# Patient Record
Sex: FEMALE
[De-identification: names, ages, dates, MRNs, and addresses within clinical notes are randomized per-mention and may not be internally consistent; named-entity substitution may affect disease eponyms.]

---

## 2018-08-27 ENCOUNTER — HOSPITAL ENCOUNTER (OUTPATIENT)
Dept: HOSPITAL 14 - H.ER | Age: 33
Setting detail: OBSERVATION
LOS: 1 days | Discharge: HOME | End: 2018-08-28
Attending: OBSTETRICS & GYNECOLOGY | Admitting: OBSTETRICS & GYNECOLOGY
Payer: COMMERCIAL

## 2018-08-27 DIAGNOSIS — E03.9: ICD-10-CM

## 2018-08-27 DIAGNOSIS — E86.1: ICD-10-CM

## 2018-08-27 DIAGNOSIS — D62: ICD-10-CM
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
## 2018-08-27 DIAGNOSIS — N99.820: Primary | ICD-10-CM

## 2018-08-27 LAB
ALBUMIN SERPL-MCNC: 4.8 G/DL (ref 3.5–5)
ALBUMIN/GLOB SERPL: 1.4 {RATIO} (ref 1–2.1)
ALT SERPL-CCNC: 27 U/L (ref 9–52)
APTT BLD: 43.5 SECONDS (ref 25.6–37.1)
AST SERPL-CCNC: 24 U/L (ref 14–36)
BASE EXCESS BLDV CALC-SCNC: 2.8 MMOL/L (ref 0–2)
BASOPHILS # BLD AUTO: 0 K/UL (ref 0–0.2)
BASOPHILS NFR BLD: 0.5 % (ref 0–2)
BUN SERPL-MCNC: 15 MG/DL (ref 7–17)
CALCIUM SERPL-MCNC: 9.6 MG/DL (ref 8.4–10.2)
EOSINOPHIL # BLD AUTO: 0 K/UL (ref 0–0.7)
EOSINOPHIL NFR BLD: 0.1 % (ref 0–4)
ERYTHROCYTE [DISTWIDTH] IN BLOOD BY AUTOMATED COUNT: 15.6 % (ref 11.5–14.5)
GFR NON-AFRICAN AMERICAN: > 60
HGB BLD-MCNC: 13.2 G/DL (ref 12–16)
INR PPP: 1
LYMPHOCYTES # BLD AUTO: 2.4 K/UL (ref 1–4.3)
LYMPHOCYTES NFR BLD AUTO: 27 % (ref 20–40)
MCH RBC QN AUTO: 29.8 PG (ref 27–31)
MCHC RBC AUTO-ENTMCNC: 33.4 G/DL (ref 33–37)
MCV RBC AUTO: 89.2 FL (ref 81–99)
MONOCYTES # BLD: 0.7 K/UL (ref 0–0.8)
MONOCYTES NFR BLD: 7.5 % (ref 0–10)
NEUTROPHILS # BLD: 5.8 K/UL (ref 1.8–7)
NEUTROPHILS NFR BLD AUTO: 64.9 % (ref 50–75)
NRBC BLD AUTO-RTO: 0.1 % (ref 0–0)
PCO2 BLDV: 63 MMHG (ref 40–60)
PH BLDV: 7.3 [PH] (ref 7.32–7.43)
PLATELET # BLD: 223 K/UL (ref 130–400)
PMV BLD AUTO: 10.3 FL (ref 7.2–11.7)
PROTHROMBIN TIME: 11 SECONDS (ref 9.8–13.1)
RBC # BLD AUTO: 4.42 MIL/UL (ref 3.8–5.2)
VENOUS BLOOD FIO2: 21 %
VENOUS BLOOD GAS PO2: 17 MM/HG (ref 30–55)
WBC # BLD AUTO: 9 K/UL (ref 4.8–10.8)

## 2018-08-27 PROCEDURE — 86850 RBC ANTIBODY SCREEN: CPT

## 2018-08-27 PROCEDURE — 84702 CHORIONIC GONADOTROPIN TEST: CPT

## 2018-08-27 PROCEDURE — 82803 BLOOD GASES ANY COMBINATION: CPT

## 2018-08-27 PROCEDURE — 99285 EMERGENCY DEPT VISIT HI MDM: CPT

## 2018-08-27 PROCEDURE — 85025 COMPLETE CBC W/AUTO DIFF WBC: CPT

## 2018-08-27 PROCEDURE — 85027 COMPLETE CBC AUTOMATED: CPT

## 2018-08-27 PROCEDURE — 85610 PROTHROMBIN TIME: CPT

## 2018-08-27 PROCEDURE — 96360 HYDRATION IV INFUSION INIT: CPT

## 2018-08-27 PROCEDURE — 85730 THROMBOPLASTIN TIME PARTIAL: CPT

## 2018-08-27 PROCEDURE — 88307 TISSUE EXAM BY PATHOLOGIST: CPT

## 2018-08-27 PROCEDURE — 80053 COMPREHEN METABOLIC PANEL: CPT

## 2018-08-27 PROCEDURE — 93005 ELECTROCARDIOGRAM TRACING: CPT

## 2018-08-27 PROCEDURE — 36415 COLL VENOUS BLD VENIPUNCTURE: CPT

## 2018-08-27 PROCEDURE — 86900 BLOOD TYPING SEROLOGIC ABO: CPT

## 2018-08-27 PROCEDURE — 57522 CONIZATION OF CERVIX: CPT

## 2018-08-27 PROCEDURE — 86920 COMPATIBILITY TEST SPIN: CPT

## 2018-08-27 PROCEDURE — 57720 REVISION OF CERVIX: CPT

## 2018-08-27 PROCEDURE — 36430 TRANSFUSION BLD/BLD COMPNT: CPT

## 2018-08-27 RX ADMIN — VENLAFAXINE HYDROCHLORIDE SCH MG: 75 CAPSULE, EXTENDED RELEASE ORAL at 23:54

## 2018-08-27 RX ADMIN — VENLAFAXINE HYDROCHLORIDE SCH MG: 150 CAPSULE, EXTENDED RELEASE ORAL at 23:54

## 2018-08-27 NOTE — ED PDOC
HPI: Female  Pain


Time Seen by Provider: 08/27/18 16:19


Chief Complaint (Nursing): Female Genitourinary


History Per: Patient


History/Exam Limitations: clinical condition


Onset/Duration Of Symptoms: Days


Current Symptoms Are (Timing): Still Present


Additional Complaint(s): 





32 yo F presented to the ED with complaint of vaginal bleeding following a 

colposcopy procedure.  RN noted patient to be tachycardic and immediately 

placed IVs and began fluids and came to get this writter for immediate 

evaluation. On entering the room, the patient was altered and then became 

unconscious.  Heart rate was seen to drop from the 70s at the time of entering 

the room to the 50s.  Bright red blood was seen covering both legs and a large 

grapefruit sized clot seen between her legs with continuous bleeding from 

vagina seen.  Pt was pale, unresponsive to sternal rub but BP was normal and 

breath sounds were clear bilaterally with 99% O2 sats on NC. A second liter of 

fluids was hung with pressure bags. Gynecologist was immediately called to the 

ED, an RN went to the blood bank to get 2 units of O neg blood, and patient 

moved into the trauma room.  Once in trauma room blood transfusion was started.

  In trauma room, pt became responsive and able to provide the following history

:





On August 21st, pt had a cone biopsy performed by her gynecologist in [facility] 

(near pts home in [place]).  Pt continued to have spotting for the next two 

days and return to the gynecologist and a "sponge" was place to stop the 

bleeding.  TOday the patient had to take her son to [place] where he sees 

a specialist for a prosthetic eye.  She was then driving to [place] to visit 

her sister when she noticed she was bleeding heavily.  When she was closer to 

her sister's house, the blood was "pouring from the vagina".  She called her 

sister to meet her at the hospital. 





Pt states she has been following a gynecologist routinely due to abnormalities 

following the birth of her last child 2 years ago and for HPV workup.  She 

denies history of bleeding disorders.  Pt has hypothyroidism and takes 

medications for that.  





Gynecologist quickly responded to the ED and his initially evaluation, he 

determined the patient needed to go to the OR.  Labs had been sent.


Abnormal Vaginal Bleeding: Yes





Past Medical History


Vital Signs: 





 Last Vital Signs











Temp  98.1 F   08/27/18 17:47


 


Pulse  76   08/27/18 17:47


 


Resp  19   08/27/18 17:47


 


BP  129/70   08/27/18 17:47


 


Pulse Ox  100   08/27/18 17:47














- Medical History


PMH: Hypothyroidism





- Family History


Family History: States: Unknown Family Hx





- Home Medications


Home Medications: 


 Ambulatory Orders











 Medication  Instructions  Recorded


 


Ethinyl Estradiol/Drospirenone 1 each PO DAILY 08/27/18





[Khadra 28 Tablet]  


 


Levothyroxine [Synthroid] 75 mcg PO DAILY 08/27/18


 


Venlafaxine [Effexor XR] 150 mg PO DAILY 08/27/18


 


Venlafaxine [Effexor-XR] 75 mg PO DAILY 08/27/18














- Allergies


Allergies/Adverse Reactions: 


 Allergies











Allergy/AdvReac Type Severity Reaction Status Date / Time


 


No Known Allergies Allergy   Verified 08/27/18 16:05














Review of Systems


Constitutional: Positive for: Weakness


Cardiovascular: Negative for: Chest Pain, Palpitations


Respiratory: Negative for: Cough, Shortness of Breath


Gastrointestinal: Negative for: Nausea, Vomiting, Abdominal Pain


Genitourinary Female: Positive for: Vaginal Bleeding


Neurological: Positive for: Weakness





Physical Exam





- Physical Exam


Appears: Positive for: In Acute Distress


Head Exam: Positive for: ATRAUMATIC


Skin: Positive for: Pallor, Cyanosis


Eye Exam: Positive for: Normal appearance, EOMI, PERRL


Cardiovascular/Chest: Positive for: Bradycardia, Tachycardia (initially 

tachycardic and became bradycardic during resuscitaiton)


Respiratory: Positive for: Normal Breath Sounds


Pulses-Dorsalis Pedis (L): 1+


Pulses-Dorsalis Pedis (R): 1+


Pulses-Radial (L): 1+


Pulses-Radial (R): 1+


Pelvic Exam: Positive for: Active Bleeding (with large clots filling multiple 

basins)





- Laboratory Results


Result Diagrams: 


 08/28/18 05:45





 08/27/18 16:35





- ECG


O2 Sat by Pulse Oximetry: 100





- Critical Care


Total Time (In Min): 100 (resuscitation and stabilation for OR)





Medical Decision Making


Medical Decision Making: 





see HPI.  Pt initially walked in with vaginal bleeding following colposcopy.  

Sutures most likely didn't hold causing severe vaginal bleeding leading to 

hypovolemia.  Pt responded to initial IV fluids and then was started on 2L of 

blood transfusion. Gyneclogist promptly responded to the ED and arrived as pt 

regained consciousness.  Gynecologist did pelvic exam and determined pt needed 

to go to the OR.  Labs sent from ED and pt was able to provided consent. 

Additional PRBCs and IV fluids ordered.





Disposition





- Clinical Impression


Clinical Impression: 


 Acute bleeding, Vaginal bleeding








- Patient ED Disposition


Is Patient to be Admitted: Yes





- Disposition


Disposition Time: 15:40


Condition: CRITICAL

## 2018-08-27 NOTE — CP.PCM.HP
History of Present Illness





- History of Present Illness


History of Present Illness: 





32yo female 1 week s/p LEEP cone bx.  Pt reports multiple episodes of vaginal 

bleeding since procedure.  Pt reports heavy bleeding today.  Pt presented to ED 

with unstable VS and loss of consciousness.  Pt given IVF and VS normalized.  





Present on Admission





- Present on Admission


Any Indicators Present on Admission: No


History of DVT/PE: No


History of Uncontrolled Diabetes: No


Urinary Catheter: No


Decubitus Ulcer Present: No





Review of Systems





- Review of Systems


Systems not reviewed;Unavailable: Unstable Vital Signs





Past Patient History





- Past Social History


Smoking Status: Never Smoked





- ENDOCRINE/METABOLIC


Hx Endocrine Disorders: Yes


Hx Hypothyroidism: Yes





- PSYCHIATRIC


Hx Substance Use: No





- SURGICAL HISTORY


Hx Surgeries: Yes


Other/Comment: Breast reduction, cone biopsy, pilonidal cyst removal.





Meds


Allergies/Adverse Reactions: 


 Allergies











Allergy/AdvReac Type Severity Reaction Status Date / Time


 


No Known Allergies Allergy   Verified 08/27/18 16:05














Physical Exam





- Constitutional


Appears: In Acute Distress





- Respiratory Exam


Respiratory Exam: Clear to Auscultation Bilateral, NORMAL BREATHING PATTERN





- Cardiovascular Exam


Cardiovascular Exam: REGULAR RHYTHM





- GI/Abdominal Exam


GI & Abdominal Exam: Soft.  absent: Distended, Tenderness





-  Exam


Additional comments: 





Large amount of blood and clots in vaginal.  Area actively bleeding.  Unable to 

fully visualize cervix due to heavy amount of bleeding.











Results





- Vital Signs


Recent Vital Signs: 





 Last Vital Signs











Temp  98.3 F   08/27/18 17:05


 


Pulse  81   08/27/18 17:05


 


Resp  13   08/27/18 17:05


 


BP  136/88   08/27/18 17:05


 


Pulse Ox  99   08/27/18 16:05














- Labs


Result Diagrams: 


 08/27/18 16:35





 08/27/18 16:35


Labs: 





 Laboratory Results - last 24 hr











  08/27/18 08/27/18 08/27/18





  16:15 16:31 16:35


 


WBC   


 


RBC   


 


Hgb   


 


Hct   


 


MCV   


 


MCH   


 


MCHC   


 


RDW   


 


Plt Count   


 


MPV   


 


Neut % (Auto)   


 


Lymph % (Auto)   


 


Mono % (Auto)   


 


Eos % (Auto)   


 


Baso % (Auto)   


 


Neut # (Auto)   


 


Lymph # (Auto)   


 


Mono # (Auto)   


 


Eos # (Auto)   


 


Baso # (Auto)   


 


PT   


 


INR   


 


APTT   


 


pO2   17 L 


 


VBG pH   7.30 L 


 


VBG pCO2   63 H 


 


VBG HCO3   25.0 


 


VBG Total CO2   32.9 H 


 


VBG O2 Sat (Calc)   22.5 L 


 


VBG Base Excess   2.8 H 


 


VBG Potassium   3.6 


 


Sodium   138.0  139


 


Chloride   100.0  99


 


Glucose   99 


 


Lactate   2.0 


 


FiO2   21.0 


 


Blood Gas Comments   Lac=2.0 


 


Crit Value Called To   jorge l Cam 


 


Crit Value Called By   22 


 


Crit Value Read Back   Y 


 


Blood Gas Notified Time   1640 


 


Potassium    3.5 L


 


Carbon Dioxide    26


 


Anion Gap    18


 


BUN    15


 


Creatinine    0.7


 


Est GFR ( Amer)    > 60


 


Est GFR (Non-Af Amer)    > 60


 


Random Glucose    105


 


Calcium    9.6


 


Total Bilirubin    0.2


 


AST    24


 


ALT    27


 


Alkaline Phosphatase    71


 


Total Protein    8.2


 


Albumin    4.8


 


Globulin    3.4


 


Albumin/Globulin Ratio    1.4


 


Beta HCG, Quant    < 2.39


 


Venous Blood Potassium   3.6 


 


Crossmatch  See Detail  














  08/27/18 08/27/18





  16:35 16:35


 


WBC  9.0 


 


RBC  4.42 


 


Hgb  13.2 


 


Hct  39.4 


 


MCV  89.2 


 


MCH  29.8 


 


MCHC  33.4 


 


RDW  15.6 H 


 


Plt Count  223 


 


MPV  10.3 


 


Neut % (Auto)  64.9 


 


Lymph % (Auto)  27.0 


 


Mono % (Auto)  7.5 


 


Eos % (Auto)  0.1 


 


Baso % (Auto)  0.5 


 


Neut # (Auto)  5.8 


 


Lymph # (Auto)  2.4 


 


Mono # (Auto)  0.7 


 


Eos # (Auto)  0.0 


 


Baso # (Auto)  0.0 


 


PT   11.0


 


INR   1.0


 


APTT   43.5 H


 


pO2  


 


VBG pH  


 


VBG pCO2  


 


VBG HCO3  


 


VBG Total CO2  


 


VBG O2 Sat (Calc)  


 


VBG Base Excess  


 


VBG Potassium  


 


Sodium  


 


Chloride  


 


Glucose  


 


Lactate  


 


FiO2  


 


Blood Gas Comments  


 


Crit Value Called To  


 


Crit Value Called By  


 


Crit Value Read Back  


 


Blood Gas Notified Time  


 


Potassium  


 


Carbon Dioxide  


 


Anion Gap  


 


BUN  


 


Creatinine  


 


Est GFR ( Amer)  


 


Est GFR (Non-Af Amer)  


 


Random Glucose  


 


Calcium  


 


Total Bilirubin  


 


AST  


 


ALT  


 


Alkaline Phosphatase  


 


Total Protein  


 


Albumin  


 


Globulin  


 


Albumin/Globulin Ratio  


 


Beta HCG, Quant  


 


Venous Blood Potassium  


 


Crossmatch  














Assessment & Plan





- Assessment and Plan (Free Text)


Assessment: 





Acute vaginal bleeding s/p LEEP cone biopsy;  Unstable VS with evidence of 

acute anemia.


Plan: 





Discussed with patient the need for exam under anesthesia and stopping the 

bleeding with either electrocautery or suturing.  Discussed the R/B/A of 

surgery with patient and all patient questions answered.  OR notified and 

patient transferred STAT to ED.





- Date & Time


Date: 08/27/18


Time: 17:17

## 2018-08-28 VITALS — OXYGEN SATURATION: 100 %

## 2018-08-28 VITALS
SYSTOLIC BLOOD PRESSURE: 109 MMHG | DIASTOLIC BLOOD PRESSURE: 68 MMHG | RESPIRATION RATE: 18 BRPM | TEMPERATURE: 98.2 F | HEART RATE: 80 BPM

## 2018-08-28 LAB
ERYTHROCYTE [DISTWIDTH] IN BLOOD BY AUTOMATED COUNT: 15 % (ref 11.5–14.5)
HGB BLD-MCNC: 10.5 G/DL (ref 12–16)
MCH RBC QN AUTO: 30.7 PG (ref 27–31)
MCHC RBC AUTO-ENTMCNC: 34.8 G/DL (ref 33–37)
MCV RBC AUTO: 88.5 FL (ref 81–99)
PLATELET # BLD: 127 K/UL (ref 130–400)
RBC # BLD AUTO: 3.4 MIL/UL (ref 3.8–5.2)
WBC # BLD AUTO: 8.8 K/UL (ref 4.8–10.8)

## 2018-08-28 RX ADMIN — OXYCODONE HYDROCHLORIDE AND ACETAMINOPHEN PRN TAB: 5; 325 TABLET ORAL at 05:17

## 2018-08-28 RX ADMIN — OXYCODONE HYDROCHLORIDE AND ACETAMINOPHEN PRN TAB: 5; 325 TABLET ORAL at 01:19

## 2018-08-28 RX ADMIN — VENLAFAXINE HYDROCHLORIDE SCH: 150 CAPSULE, EXTENDED RELEASE ORAL at 09:00

## 2018-08-28 RX ADMIN — VENLAFAXINE HYDROCHLORIDE SCH: 75 CAPSULE, EXTENDED RELEASE ORAL at 09:00

## 2018-08-28 RX ADMIN — VENLAFAXINE HYDROCHLORIDE SCH MG: 150 CAPSULE, EXTENDED RELEASE ORAL at 08:57

## 2018-08-28 RX ADMIN — VENLAFAXINE HYDROCHLORIDE SCH MG: 75 CAPSULE, EXTENDED RELEASE ORAL at 08:57

## 2018-08-28 NOTE — OP
Copied To:  Dewey Arceo MD

Attending MD:  Dewey Arceo MD



PROCEDURE DATE:  08/27/2018



PREOPERATIVE DIAGNOSES:  Acute vaginal bleedings, approximately one week

status post loop electrosurgery excision procedure cone biopsy.  Acute

anemia due to blood loss, requiring transfusion.



POSTOPERATIVE DIAGNOSES:  Acute vaginal bleedings, approximately one week

status post loop electrosurgery excision procedure cone biopsy.  Acute

anemia due to blood loss, requiring transfusion.



OPERATION PERFORMED:  Examination under anesthesia, revision of loop

electrosurgery excision procedure cone biopsy site, controlling of

hemorrhage with electrocautery and suturing.



OPERATIVE FINDINGS:  Cone biopsy site with active bleeding.  Otherwise,

normal appearing pelvic anatomy.



ESTIMATED BLOOD LOSS:  300 mL intraoperatively.



FLUIDS:  800 mL lactated Ringer's.



URINE OUTPUT:  200 mL of clear urine.



The patient received 2 units of packet red blood cells preoperatively and

intraoperatively.



SURGEON:  Dewey Arceo MD



ANESTHESIOLOGIST:  Dewey Shepard MD



ANESTHESIA:  General.



DESCRIPTION OF PROCEDURE:  The patient was taken to the operating room

where general anesthesia was found to be adequate.  The patient was prepped

and draped in normal sterile fashion in dorsal lithotomy position.  A

weighted speculum was placed at the posterior aspect of the vagina.  The

Elizondo retractor was placed at the anterior surface of the vagina.  After

removal of blood and clots from vagina, the cervix was visualized. 

Multiple areas of the cervix were found to be actively bleeding.  Due to

location of bleeding, a decision for revision of biopsy site.  With loop

electrocautery, an approximately 1 cm to 2 cm in length portion was removed

from the cervix.  After removal of this portion of the cervix, the biopsy

sites were treated with electrocautery until hemostasis noted.  Interrupted

sutures were placed at the lateral aspect of the cervix to promote

continued hemostasis.  At approximately 3 o'clock and 9 o'clock locations,

interrupted suture of 0 Vicryl was placed.  After placement of both

sutures, reinspection of the cervix was found to be hemostatic.



The patient tolerated the procedure well.  Sponge, lap, and needle counts

were correct x2.  There were no complications.  The patient was taken to

recovery room awake in stable condition.



__________________________________________

Dewey Arceo MD





DD:  08/28/2018 9:22:57

DT:  08/28/2018 10:00:06

[facility] # [id number]

## 2018-08-28 NOTE — PCM.SURG1
Surgeon's Initial Post Op Note





- Surgeon's Notes


Surgeon: Adis


Assistant: N/A


Type of Anesthesia: General Endo


Anesthesia Administered By: Yapp


Pre-Operative Diagnosis: Acute vaginal bleeding s/p LEEP bx


Operative Findings: Cervix with acute bleeding


Post-Operative Diagnosis: same


Operation Performed: Revision of LEEP procedure site.  Control of bleeding with 

electrocautery and suture


Specimen/Specimens Removed: Portions of cervix


Estimated Blood Loss: EBL {In ML}: 300


Blood Products Given: PRBC


Drains Used: No Drains


Post-Op Condition: Good


Date of Surgery/Procedure: 08/27/18


Time of Surgery/Procedure: 17:30

## 2018-08-28 NOTE — CP.PCM.PN
Subjective





- Date & Time of Evaluation


Date of Evaluation: 08/28/18


Time of Evaluation: 09:13





- Subjective


Subjective: 


Patient resting comfortably without complaints. Patient denies any pain. Any 

further bleeding. Patient denies any signs or symptoms of anemia.





Objective





- Vital Signs/Intake and Output


Vital Signs (last 24 hours): 


 











Temp Pulse Resp BP Pulse Ox


 


 98.2 F   80   18   109/68   99 


 


 08/28/18 07:49  08/28/18 07:49  08/28/18 07:49  08/28/18 07:49  08/28/18 07:49








Intake and Output: 


 











 08/28/18 08/28/18





 06:59 18:59


 


Output Total 850 


 


Balance -850 














- Medications


Medications: 


 Current Medications





Lactated Ringer's (Lactated Ringer's)  1,000 mls @ 100 mls/hr IV .Q10H [facility]


   Last Admin: 08/27/18 23:54 Dose:  100 mls/hr


Ibuprofen (Motrin Tab)  600 mg PO Q8 PRN


   PRN Reason: Pain, moderate (4-7)


   Last Admin: 08/28/18 00:26 Dose:  600 mg


Levothyroxine Sodium (Synthroid)  75 mcg PO DAILY@0630 [facility]


   Last Admin: 08/28/18 08:52 Dose:  Not Given


Oxycodone/Acetaminophen (Percocet 5/325 Mg Tab)  1 tab PO Q4 PRN


   PRN Reason: Pain, severe (8-10)


   Stop: 08/30/18 21:21


   Last Admin: 08/28/18 05:17 Dose:  1 tab


Venlafaxine HCl (Effexor Xr)  75 mg PO DAILY [facility]


   Last Admin: 08/28/18 09:00 Dose:  Not Given


Venlafaxine HCl (Effexor Xr)  150 mg PO DAILY [facility]


   Last Admin: 08/28/18 09:00 Dose:  Not Given











- Labs


Labs: 


 





 08/28/18 05:45 





 08/27/18 16:35 





 











PT  11.0 Seconds (9.8-13.1)   08/27/18  16:35    


 


INR  1.0   08/27/18  16:35    


 


APTT  43.5 Seconds (25.6-37.1)  H  08/27/18  16:35    














- Constitutional


Appears: Well, Non-toxic, No Acute Distress





- Head Exam


Head Exam: ATRAUMATIC





- Eye Exam


Eye Exam: Normal appearance, PERRL





- ENT Exam


ENT Exam: Mucous Membranes Moist





- Respiratory Exam


Respiratory Exam: Clear to Ausculation Bilateral, NORMAL BREATHING PATTERN





- Cardiovascular Exam


Cardiovascular Exam: REGULAR RHYTHM





- GI/Abdominal Exam


GI & Abdominal Exam: Soft.  absent: Distended, Tenderness





Assessment and Plan





- Assessment and Plan (Free Text)


Assessment: 





Status post revision of LEEP cone biopsy site and control of hemorrhage. 

Patient recovering well. Posttransfusion CBC stable.


Plan: 





Discharge home today. Patient will follow up with primary physician in 2-3 days.

## 2018-08-28 NOTE — CP.PCM.DIS
Provider





- Provider


Date of Admission: 


08/27/18 16:43





Attending physician: 


Dewey Acreo MD





Time Spent in preparation of Discharge (in minutes): 10





Diagnosis





- Discharge Diagnosis


(1) Acute bleeding


Status: Acute   





(2) Acute blood loss anemia


Status: Acute   





Hospital Course





- Lab Results


Lab Results: 


 Most Recent Lab Values











WBC  8.8 K/uL (4.8-10.8)   08/28/18  05:45    


 


RBC  3.40 Mil/uL (3.80-5.20)  L  08/28/18  05:45    


 


Hgb  10.5 g/dL (12.0-16.0)  L D 08/28/18  05:45    


 


Hct  30.1 % (34.0-47.0)  L  08/28/18  05:45    


 


MCV  88.5 fl (81.0-99.0)   08/28/18  05:45    


 


MCH  30.7 pg (27.0-31.0)   08/28/18  05:45    


 


MCHC  34.8 g/dL (33.0-37.0)   08/28/18  05:45    


 


RDW  15.0 % (11.5-14.5)  H  08/28/18  05:45    


 


Plt Count  127 K/uL (130-400)  L D 08/28/18  05:45    


 


MPV  10.3 fl (7.2-11.7)   08/27/18  16:35    


 


Neut % (Auto)  64.9 % (50.0-75.0)   08/27/18  16:35    


 


Lymph % (Auto)  27.0 % (20.0-40.0)   08/27/18  16:35    


 


Mono % (Auto)  7.5 % (0.0-10.0)   08/27/18  16:35    


 


Eos % (Auto)  0.1 % (0.0-4.0)   08/27/18  16:35    


 


Baso % (Auto)  0.5 % (0.0-2.0)   08/27/18  16:35    


 


Neut # (Auto)  5.8 K/uL (1.8-7.0)   08/27/18  16:35    


 


Lymph # (Auto)  2.4 K/uL (1.0-4.3)   08/27/18  16:35    


 


Mono # (Auto)  0.7 K/uL (0.0-0.8)   08/27/18  16:35    


 


Eos # (Auto)  0.0 K/uL (0.0-0.7)   08/27/18  16:35    


 


Baso # (Auto)  0.0 K/uL (0.0-0.2)   08/27/18  16:35    


 


PT  11.0 Seconds (9.8-13.1)   08/27/18  16:35    


 


INR  1.0   08/27/18  16:35    


 


APTT  43.5 Seconds (25.6-37.1)  H  08/27/18  16:35    


 


pO2  17 mm/Hg (30-55)  L  08/27/18  16:31    


 


VBG pH  7.30  (7.32-7.43)  L  08/27/18  16:31    


 


VBG pCO2  63 mmHg (40-60)  H  08/27/18  16:31    


 


VBG HCO3  25.0 mmol/L  08/27/18  16:31    


 


VBG Total CO2  32.9 mmol/L (22-28)  H  08/27/18  16:31    


 


VBG O2 Sat (Calc)  22.5 % (40-65)  L  08/27/18  16:31    


 


VBG Base Excess  2.8 mmol/L (0.0-2.0)  H  08/27/18  16:31    


 


VBG Potassium  3.6 mmol/L (3.6-5.2)   08/27/18  16:31    


 


Sodium  138.0 mmol/L (132-148)   08/27/18  16:31    


 


Chloride  100.0 mmol/L ()   08/27/18  16:31    


 


Glucose  99 mg/dL ()   08/27/18  16:31    


 


Lactate  2.0 mmol/L (0.7-2.1)   08/27/18  16:31    


 


FiO2  21.0 %  08/27/18  16:31    


 


Blood Gas Comments  Lac=2.0   08/27/18  16:31    


 


Crit Value Called To  jorge l Cam   08/27/18  16:31    


 


Crit Value Called By  22 08/27/18  16:31    


 


Crit Value Read Back  Y   08/27/18  16:31    


 


Blood Gas Notified Time  1640   08/27/18  16:31    


 


Sodium  139 mmol/l (132-148)   08/27/18  16:35    


 


Potassium  3.5 MMOL/L (3.6-5.0)  L  08/27/18  16:35    


 


Chloride  99 mmol/L ()   08/27/18  16:35    


 


Carbon Dioxide  26 mmol/L (22-30)   08/27/18  16:35    


 


Anion Gap  18  (10-20)   08/27/18  16:35    


 


BUN  15 mg/dl (7-17)   08/27/18  16:35    


 


Creatinine  0.7 mg/dl (0.7-1.2)   08/27/18  16:35    


 


Est GFR ( Amer)  > 60   08/27/18  16:35    


 


Est GFR (Non-Af Amer)  > 60   08/27/18  16:35    


 


Random Glucose  105 mg/dL ()   08/27/18  16:35    


 


Calcium  9.6 mg/dL (8.4-10.2)   08/27/18  16:35    


 


Total Bilirubin  0.2 mg/dl (0.2-1.3)   08/27/18  16:35    


 


AST  24 U/L (14-36)   08/27/18  16:35    


 


ALT  27 U/L (9-52)   08/27/18  16:35    


 


Alkaline Phosphatase  71 U/L ()   08/27/18  16:35    


 


Total Protein  8.2 G/DL (6.3-8.2)   08/27/18  16:35    


 


Albumin  4.8 g/dL (3.5-5.0)   08/27/18  16:35    


 


Globulin  3.4 gm/dL (2.2-3.9)   08/27/18  16:35    


 


Albumin/Globulin Ratio  1.4  (1.0-2.1)   08/27/18  16:35    


 


Beta HCG, Quant  < 2.39 mIU/mL  08/27/18  16:35    


 


Venous Blood Potassium  3.6 mmol/L (3.6-5.2)   08/27/18  16:31    


 


Blood Type  O POSITIVE   08/27/18  16:15    


 


Antibody Screen  Negative   08/27/18  16:15    


 


Crossmatch  See Detail   08/27/18  16:15    


 


BBK History Checked  No verified bt   08/27/18  16:15    














Discharge Exam





- Head Exam


Head Exam: ATRAUMATIC





Discharge Plan





- Follow Up Plan


Condition: GOOD


Disposition: HOME/ ROUTINE

## 2018-08-28 NOTE — CARD
--------------- APPROVED REPORT --------------





Date of service: 08/27/2018



<Conclusion>

*** Poor data quality, interpretation may be adversely affected

Sinus bradycardia

Otherwise normal ECG